# Patient Record
Sex: MALE | Race: BLACK OR AFRICAN AMERICAN | NOT HISPANIC OR LATINO | Employment: FULL TIME | URBAN - METROPOLITAN AREA
[De-identification: names, ages, dates, MRNs, and addresses within clinical notes are randomized per-mention and may not be internally consistent; named-entity substitution may affect disease eponyms.]

---

## 2017-05-30 DIAGNOSIS — M47.817 LUMBOSACRAL SPONDYLOSIS: Primary | ICD-10-CM

## 2017-06-02 ENCOUNTER — CLINICAL SUPPORT (OUTPATIENT)
Dept: REHABILITATION | Facility: HOSPITAL | Age: 37
End: 2017-06-02
Attending: NEUROLOGICAL SURGERY
Payer: OTHER MISCELLANEOUS

## 2017-06-02 DIAGNOSIS — M54.50 ACUTE MIDLINE LOW BACK PAIN WITHOUT SCIATICA: Primary | ICD-10-CM

## 2017-06-02 DIAGNOSIS — M47.817 SPONDYLOSIS OF LUMBOSACRAL REGION WITHOUT MYELOPATHY OR RADICULOPATHY: ICD-10-CM

## 2017-06-02 PROCEDURE — 97161 PT EVAL LOW COMPLEX 20 MIN: CPT | Performed by: PHYSICAL MEDICINE & REHABILITATION

## 2017-06-02 PROCEDURE — 97110 THERAPEUTIC EXERCISES: CPT | Performed by: PHYSICAL MEDICINE & REHABILITATION

## 2017-06-02 NOTE — PLAN OF CARE
Create Note                              My Note   8:15 AM             Edit                                       Name:Shane Harry    Physician:Michele Ace Jr.,*    Date of eval:6/2/2017    Orders:  Physical Therapy evaluate and treat    Clinic: [id number]    Diagnosis:      1.     Acute midline low back pain without sciatica              2.     Spondylosis of lumbosacral region without myelopathy or radiculopathy                        Precautions: none    Evaluation date: 6/2/2017    Visit # authorized: 1/12    Authorization period: 9/12/17    Plan of care expiration: 6/30/17         Start time: 8:15 AM    Stop Time: 9:00 AM           Timed                   Procedure      Min         Units        TE x 1      15     1                                                                          Untimed                   Procedure      Min      Units        IE     30     1                                                           Subjective            Chief complaint: Patient  has had low back pain since August, 2013 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 At time of injury went to PCP, and was given medication and had MRI.   [facility] and had [facility] a couple of months ago---doesn't have results with him.  has been to physical therapy with no relief of pain.  has had several JODI to lumbar spine with last one about a year ago.     Onset of pain : 2013     Mechanism of onset :  Merchandise falling on him at work         Aggravating factors: sitting > 10 mins, standing > 15 mins, walking > 2 blocks, lifting > 20#    Easing factors: Norco,Flexoril,   heat    Sleep is disturbed. Sleeping position: sides    Loss of Bowel/Bladder Function: (-)    PLOF  Includes:Independent with ADL's prior to 2013    Prior Physical Therapy: for low back about 2 years ago    Current functional limitations: sitting,  "walking, standing, lifting,          Home/Living Environment: Lives alone with no steps in home         Pertinent PMH/Comorbidities:  None         Patients structured exercise routine: None    Exercise routine prior to onset: None         Occupation: Pt no longer employed.              MRI: none on file             Xray: None on file         Pain level with 0 being the lowest and 10 being the highest presently: 7/10    Pain level with 0 being the lowest and 10 being the highest at worst: 10/10    Pain level with 0 being the lowest and 10 being the highest at best: 5/10          Patient Goals: "i want to feel better"           Objective            Postural examination in standing and sitting:    - (+)   forward head    - (+) forward shoulders    - (-) hip high    -(-) shoulder high    - (+)decreased lumbar lordosis    - (-)Thoracic kyphosis              Functional assessment:     - walking/gait:Ambulates independent of AD with normal gait pattern    - sit to stand: Independent    - sit to supine: Independent         - supine to sit: independent    - supine to prone: independent          Flexibility testing:    - hamstrings:     90/90 test R -45 L -35             - gastrocnemius:   DF ankle R 10 degrees L 10 degrees    - piriformis:   (+) (B)  tightness               - quadriceps:  (+) (B)   tightness              - hip flexors:(+) (b) tightness    - hip adductors: (-)    - IT Bands: (-)         Lumbar ROM: (measured in degrees)               Degrees     Quality       Flexion     50       With c/o pain in low back thruROM       Extension     10       With c/o pain in low back thru ROM       Left Side Bending     30     With no c/o pain       Right Side Bending     30     With c/o pain in low back thru ROM                                                    AROM bilateral hips, knees and ankles WNL         Dermatomes: (impaired/normal)              RLE     LLE       L2     Intact     Intact       L3     Intact " "    Intact       L4     Intact     Intact       L5     Intact     Intact       S1     Intact     Intact            Muscle Strength      MMT     R     L       Hip flexion     5/5     5/5       Hip abduction     5/5     5/5       Hip extension     5/5     5/5       Hip ER     5/5     5/5       Hip IR     5/5     5/5       Knee extension     5/5     5/5       Knee flexion     5/5     5/5       Ankle dorsiflexion     5/5     5/5       Ankle plantar flexion     5/5     5/5       Ankle inversion     5/5     5/5       Ankle eversion     5/5     5/5            Reflexes: 2+          Special Tests: ((+): pos.; (-): neg.)     SLR Test: (-) (B)            SKC Test: (-) (B)    Hip Screen:(-) DAJA for low back pain      Repetitive Motion: (-) for pain in low back with flexion       Prone Instability: NT      Saddle Sensation: (-)           Palpation for condition: No c/o pain on palpation of lumbar spine and paraspinal muscles               Joint Mobility:  Hypomobilit lumbar vertebra          Endurance is Good         PT Evaluation Completed? Yes    Discussed Plan of Care with patient: Yes         Outcome measures:      Impairment function:  Mobility    FOTO score: 36/100              TREATMENT:    Therapeutic exercise: Shane received therapeutic exercises to develop strength, stabilization and endurance; flexibility and range of motion for 15 minutes including:see HEP sheet.            Date      6/2/17       Visit       1/12       POC Exp Date      6/30/17       Face to Face              LTR     1 x 10       Supine HSS (B)      5 x 10"       Supine Piriformis (S) (B)      5 x 10"       Supine Hip flexor (S) (B)      5 x 10"       Prone Press Ups      1 x 10       Standing Back Extension      1 x 10                                                               Initials          VK                      Pt. Education: Instructed pt. regarding:proper technique with all exercises and use of lumbar roll with sitting.  Pt. to " demonstrate good understanding of the education provided. Shane demonstrated good return demonstration of activities. No cultural, environmental, or spiritual barriers identified to treatment or learning.           Assessment       This is a 37 y.o. male referred to outpatient physical therapy and presents with a medical diagnosis of low back pain and spondylosis lumbar spine and PT diagnosis/findings of low back pain, decreased AROM lumbar spine, poor posture, decreased strength core and back extensor muscles, demonstrating limitations as described in the problem list. Patient was in agreement with set goals and plan of care. Pt was given a written HEP along with posture education, instruction on body mechanics, activity modification/avoidance, and low back/LE stretching regimen. Pt. verbally understood instructions and demonstrated proper form/technique. Pt was advised to perform these exercises free of pain, and discontinue use if symptoms persist/worsen. Pt will benefit from physical therapy services in order to maximize pain free functional independence. Rehab potential is good.           History    Co-morbidities and personal factors that may impact the plan of care     Examination    Body Structures and Functions, activity limitations and participation restrictions that may impact the plan of care     Clinical Presentation       Decision Making/ Complexity Score       Co-morbidities:     None              Personal Factors:     None     Body Regions: Lumbar spine         Body Systems: Musculoskeletal: decreased AROM lumbar spine, poor posture, decreased strength core and back extensor muscles                        Activity limitations/Participation Restrictions: Mobility, General tasks and demands                                Stable and uncomplicated               Low              FOTO Score: 36/100                      Medical necessity is demonstrated by the following IMPAIRMENTS/PROBLEM  LIST:    Decreased range of motion lumbar spine    Decreased strength core nad back extensor muscles    Increased pain with walking    Poor Posture    Decreased Flexibility     Increased pain with prolonged standing    Increased pain prolonged sitting    Disturbed sleep    ADL and household activities lead to increased pain and are limited    Functional impairment rating of: 60%         GOALS:     Short Term Goals:  2 weeks    Increase range of motion 25%    Increase strength 1/2 muscle grade    Improve flexibility of low back and hips    Patient will demonstrate good posture and body mechanics with ADL's    Be able to perform HEP with minimal cueing required    Improve functional impairment of mobility to 40%         Long Term Goals: 4 weeks    Increase range of motion to 75% to 100% full     Improve muscle strength with MMT to 4+/5 to 5/5 core and back extensor muscles    Restore ability to ambulate with normal pain free gait    Walking for ADL and exercise will be restored without increased pain    Restore ability to stand for ADL without increased pain    Restore ability to sit  without increased pain    Restore normal sleep habits without disturbances due to pain    Restore ability to perform ADL's and household activities independently and without increased pain    Improve functional impairment of mobility to 20% or less           Plan            Pt will be treated by physical therapy 3 times a week for 4 weeks to include: Therapeutic exercises to increase ROM, strength and stabilization; joint and soft tissue mobilization with manual therapy techniques to decrease muscle tightness, pain and improve joint mobility; neuromuscular re-education to improve balance, coordination, kinesthetic sense and proprioception, therapeutic activities to improve coordination, strength and function, therapeutic taping to decrease pain, provide support and improve function of the LE(s); modalities such as moist heat, ice,  ultrasound and electrical stimulation to increase circulation, decrease pain and inflammation; dry needling with manual therapy techniques to decrease pain, inflammation and swelling, increase circulation and promote healing process will be considered and utilized as needed;  aquatic physical therapy will be utilized as needed.  Pt may be seen by PTA to carry out plan of care as part of the Rehab team.         I certify the need for these services furnished under this plan of treatment and while under my care.         ____________________________________ Physician/Referring Practitioner                                                   Date of Signature                        Inna العراقي PT

## 2017-06-02 NOTE — PROGRESS NOTES
Name:Shane Harry  Physician:Michele Ace Jr.,*  Date of eval:6/2/2017  Orders:  Physical Therapy evaluate and treat  Clinic: [id number]  Diagnosis:  1. Acute midline low back pain without sciatica     2. Spondylosis of lumbosacral region without myelopathy or radiculopathy         Precautions: none  Evaluation date: 6/2/2017  Visit # authorized: 1/12  Authorization period: 9/12/17  Plan of care expiration: 6/30/17    Start time: 8:15 AM  Stop Time: 9:00 AM    Timed     Procedure  Min     Units    TE x 1  15 1                    Untimed     Procedure  Min  Units    IE 30 1                 Subjective     Chief complaint: Patient  has had low back pain since August, 2013 due to merchandise at [employer] where he was working fell onto him. At time of injury went to PCP, and was given medication and had MRI.   [facility] and had MRI a couple of months ago---doesn't have results with him.  has been to physical therapy with no relief of pain.  has had several JODI to lumbar spine with last one about a year ago.   Onset of pain : 2013   Mechanism of onset :  Merchandise falling on him at work    Aggravating factors: sitting > 10 mins, standing > 15 mins, walking > 2 blocks, lifting > 20#  Easing factors: Norco,Flexoril,   heat  Sleep is disturbed. Sleeping position: sides  Loss of Bowel/Bladder Function: (-)  PLOF  Includes:Independent with ADL's prior to 2013  Prior Physical Therapy: for low back about 2 years ago  Current functional limitations: sitting, walking, standing, lifting,     Home/Living Environment: Lives alone with no steps in home    Pertinent PMH/Comorbidities:  None    Patients structured exercise routine: None  Exercise routine prior to onset: None    Occupation: Pt no longer employed.      MRI: none on file           Xray: None on file    Pain level with 0 being the lowest and 10 being the highest presently: 7/10  Pain level with 0 being the lowest  "and 10 being the highest at worst: 10/10  Pain level with 0 being the lowest and 10 being the highest at best: 5/10     Patient Goals: "i want to feel better"    Objective     Postural examination in standing and sitting:  - (+)   forward head  - (+) forward shoulders  - (-) hip high  -(-) shoulder high  - (+)decreased lumbar lordosis  - (-)Thoracic kyphosis        Functional assessment:   - walking/gait:Ambulates independent of AD with normal gait pattern  - sit to stand: Independent  - sit to supine: Independent       - supine to sit: independent  - supine to prone: independent     Flexibility testing:  - hamstrings:     90/90 test R -45 L -35           - gastrocnemius:   DF ankle R 10 degrees L 10 degrees  - piriformis:   (+) (B)  tightness             - quadriceps:  (+) (B)   tightness            - hip flexors:(+) (b) tightness  - hip adductors: (-)  - IT Bands: (-)    Lumbar ROM: (measured in degrees)    Degrees Quality   Flexion 50   With c/o pain in low back thruROM   Extension 10   With c/o pain in low back thru ROM   Left Side Bending 30 With no c/o pain   Right Side Bending 30 With c/o pain in low back thru ROM               AROM bilateral hips, knees and ankles WNL    Dermatomes: (impaired/normal)     RLE LLE   L2 Intact Intact   L3 Intact Intact   L4 Intact Intact   L5 Intact Intact   S1 Intact Intact     Muscle Strength  MMT R L   Hip flexion 5/5 5/5   Hip abduction 5/5 5/5   Hip extension 5/5 5/5   Hip ER 5/5 5/5   Hip IR 5/5 5/5   Knee extension 5/5 5/5   Knee flexion 5/5 5/5   Ankle dorsiflexion 5/5 5/5   Ankle plantar flexion 5/5 5/5   Ankle inversion 5/5 5/5   Ankle eversion 5/5 5/5     Reflexes: 2+     Special Tests: ((+): pos.; (-): neg.)   · SLR Test: (-) (B)        SKC Test: (-) (B)  · Hip Screen:(-) DAJA for low back pain  · Repetitive Motion: (-) for pain in low back with flexion   · Prone Instability: NT  · Saddle Sensation: (-)    Palpation for condition: No c/o pain on palpation of lumbar " "spine and paraspinal muscles       Joint Mobility:  Hypomobilit lumbar vertebra     Endurance is Good    PT Evaluation Completed? Yes  Discussed Plan of Care with patient: Yes    Outcome measures:    Impairment function:  Mobility  FOTO score: 36/100      TREATMENT:  Therapeutic exercise: Shane received therapeutic exercises to develop strength, stabilization and endurance; flexibility and range of motion for 15 minutes including:see HEP sheet.     Date  6/2/17   Visit   1/12   POC Exp Date  6/30/17   Face to Face     LTR 1 x 10   Supine HSS (B)  5 x 10"   Supine Piriformis (S) (B)  5 x 10"   Supine Hip flexor (S) (B)  5 x 10"   Prone Press Ups  1 x 10   Standing Back Extension  1 x 10                   Initials      VK         Pt. Education: Instructed pt. regarding:proper technique with all exercises and use of lumbar roll with sitting.  Pt. to demonstrate good understanding of the education provided. Shane demonstrated good return demonstration of activities. No cultural, environmental, or spiritual barriers identified to treatment or learning.    Assessment   This is a 37 y.o. male referred to outpatient physical therapy and presents with a medical diagnosis of low back pain and spondylosis lumbar spine and PT diagnosis/findings of low back pain, decreased AROM lumbar spine, poor posture, decreased strength core and back extensor muscles, demonstrating limitations as described in the problem list. Patient was in agreement with set goals and plan of care. Pt was given a written HEP along with posture education, instruction on body mechanics, activity modification/avoidance, and low back/LE stretching regimen. Pt. verbally understood instructions and demonstrated proper form/technique. Pt was advised to perform these exercises free of pain, and discontinue use if symptoms persist/worsen. Pt will benefit from physical therapy services in order to maximize pain free functional independence. Rehab potential is " good.    History  Co-morbidities and personal factors that may impact the plan of care Examination  Body Structures and Functions, activity limitations and participation restrictions that may impact the plan of care Clinical Presentation   Decision Making/ Complexity Score   Co-morbidities:   None      Personal Factors:   None Body Regions: Lumbar spine    Body Systems: Musculoskeletal: decreased AROM lumbar spine, poor posture, decreased strength core and back extensor muscles          Activity limitations/Participation Restrictions: Mobility, General tasks and demands             Stable and uncomplicated     Low      FOTO Score: 36/100         Medical necessity is demonstrated by the following IMPAIRMENTS/PROBLEM LIST:  Decreased range of motion lumbar spine  Decreased strength core nad back extensor muscles  Increased pain with walking  Poor Posture  Decreased Flexibility   Increased pain with prolonged standing  Increased pain prolonged sitting  Disturbed sleep  ADL and household activities lead to increased pain and are limited  Functional impairment rating of: 60%    GOALS:   Short Term Goals:  2 weeks  Increase range of motion 25%  Increase strength 1/2 muscle grade  Improve flexibility of low back and hips  Patient will demonstrate good posture and body mechanics with ADL's  Be able to perform HEP with minimal cueing required  Improve functional impairment of mobility to 40%    Long Term Goals: 4 weeks  Increase range of motion to 75% to 100% full   Improve muscle strength with MMT to 4+/5 to 5/5 core and back extensor muscles  Restore ability to ambulate with normal pain free gait  Walking for ADL and exercise will be restored without increased pain  Restore ability to stand for ADL without increased pain  Restore ability to sit  without increased pain  Restore normal sleep habits without disturbances due to pain  Restore ability to perform ADL's and household activities independently and without increased  pain  Improve functional impairment of mobility to 20% or less    Plan     Pt will be treated by physical therapy 3 times a week for 4 weeks to include: Therapeutic exercises to increase ROM, strength and stabilization; joint and soft tissue mobilization with manual therapy techniques to decrease muscle tightness, pain and improve joint mobility; neuromuscular re-education to improve balance, coordination, kinesthetic sense and proprioception, therapeutic activities to improve coordination, strength and function, therapeutic taping to decrease pain, provide support and improve function of the LE(s); modalities such as moist heat, ice, ultrasound and electrical stimulation to increase circulation, decrease pain and inflammation; dry needling with manual therapy techniques to decrease pain, inflammation and swelling, increase circulation and promote healing process will be considered and utilized as needed;  aquatic physical therapy will be utilized as needed.  Pt may be seen by PTA to carry out plan of care as part of the Rehab team.    I certify the need for these services furnished under this plan of treatment and while under my care.    ____________________________________ Physician/Referring Practitioner                                  Date of Signature          Inna العراقي PT

## 2017-06-12 ENCOUNTER — DOCUMENTATION ONLY (OUTPATIENT)
Dept: REHABILITATION | Facility: HOSPITAL | Age: 37
End: 2017-06-12

## 2017-06-12 DIAGNOSIS — M47.817 SPONDYLOSIS OF LUMBOSACRAL REGION WITHOUT MYELOPATHY OR RADICULOPATHY: ICD-10-CM

## 2017-06-12 DIAGNOSIS — M54.50 ACUTE MIDLINE LOW BACK PAIN WITHOUT SCIATICA: ICD-10-CM

## 2017-06-12 NOTE — PROGRESS NOTES
I called patient today as he has No showed for his last 4 scheduled physical therapy visits. I left Mr. Harry a message on his voice mail and asked him to please call if he would like to schedule an appointment for physical therapy. Patient has been removed from the schedule at this time due to his non-compliance with attending physical therapy.      Inna العراقي, PT

## 2017-06-30 ENCOUNTER — DOCUMENTATION ONLY (OUTPATIENT)
Dept: REHABILITATION | Facility: HOSPITAL | Age: 37
End: 2017-06-30

## 2017-06-30 DIAGNOSIS — M54.50 ACUTE MIDLINE LOW BACK PAIN WITHOUT SCIATICA: ICD-10-CM

## 2017-06-30 DIAGNOSIS — M47.817 SPONDYLOSIS OF LUMBOSACRAL REGION WITHOUT MYELOPATHY OR RADICULOPATHY: ICD-10-CM

## 2017-06-30 NOTE — PROGRESS NOTES
Mr. Harry was seen in outpatient physical therapy for an initial evaluation on 6/2/17 for low back pain. Mr. Harry has self discharged as he has No Showed for all follow up physical therapy sessions and has not returned to physical therapy since his initial evaluation. POC has ended and patient is discharged from physical therapy.      Inna العراقي, PT

## 2017-11-14 ENCOUNTER — HOSPITAL ENCOUNTER (EMERGENCY)
Facility: OTHER | Age: 37
Discharge: HOME | End: 2017-11-14
Attending: EMERGENCY MEDICINE
Payer: MEDICARE

## 2017-11-14 VITALS
TEMPERATURE: 99 F | HEART RATE: 67 BPM | SYSTOLIC BLOOD PRESSURE: 120 MMHG | HEIGHT: 75 IN | OXYGEN SATURATION: 98 % | WEIGHT: 240 LBS | BODY MASS INDEX: 29.84 KG/M2 | RESPIRATION RATE: 18 BRPM | DIASTOLIC BLOOD PRESSURE: 69 MMHG

## 2017-11-14 DIAGNOSIS — J06.9 UPPER RESPIRATORY TRACT INFECTION, UNSPECIFIED TYPE: Primary | ICD-10-CM

## 2017-11-14 DIAGNOSIS — R07.9 CHEST PAIN: ICD-10-CM

## 2017-11-14 DIAGNOSIS — R09.1 PLEURISY: ICD-10-CM

## 2017-11-14 DIAGNOSIS — R05.9 COUGH: ICD-10-CM

## 2017-11-14 PROCEDURE — 93010 ELECTROCARDIOGRAM REPORT: CPT | Mod: ,,, | Performed by: INTERNAL MEDICINE

## 2017-11-14 PROCEDURE — 99284 EMERGENCY DEPT VISIT MOD MDM: CPT | Mod: 25

## 2017-11-14 PROCEDURE — 93005 ELECTROCARDIOGRAM TRACING: CPT

## 2017-11-14 RX ORDER — IBUPROFEN 600 MG/1
600 TABLET ORAL EVERY 6 HOURS PRN
Qty: 20 TABLET | Refills: 0 | Status: SHIPPED | OUTPATIENT
Start: 2017-11-14

## 2017-11-14 RX ORDER — CETIRIZINE HYDROCHLORIDE 10 MG/1
10 TABLET ORAL DAILY
Qty: 30 TABLET | Refills: 0 | Status: SHIPPED | OUTPATIENT
Start: 2017-11-14 | End: 2018-11-14

## 2017-11-14 RX ORDER — FLUTICASONE PROPIONATE 50 MCG
1 SPRAY, SUSPENSION (ML) NASAL 2 TIMES DAILY PRN
Qty: 15 G | Refills: 0 | Status: SHIPPED | OUTPATIENT
Start: 2017-11-14

## 2017-11-14 RX ORDER — BENZONATATE 200 MG/1
200 CAPSULE ORAL 3 TIMES DAILY PRN
Qty: 20 CAPSULE | Refills: 0 | Status: SHIPPED | OUTPATIENT
Start: 2017-11-14 | End: 2017-11-24

## 2017-11-14 NOTE — ED TRIAGE NOTES
Pt present with cough, onset 3 days ago. Productive cough, pt reports green mucous. Pt reports mid sternal chest pain with coughing. Pt denies dizziness, lightheadedness, denies NVD.  Pt denies any fevers. Pt reports coughing is waking him up at night. Pt has been taking mucinex without relief. Pt in NAD.

## 2017-11-14 NOTE — ED NOTES
NEURO: Pt AAO x 4. Behavior and speech appropriate to situation.   CARDIAC: Regular rhythm auscultated  RESPIRATORY: Respirations even and unlabored. Breath sounds clear to all lung fields. diminished breath sounds to posterior lung field.   MUSCULOSKELETAL: Active ROM noted to extremities

## 2017-11-14 NOTE — ED PROVIDER NOTES
Encounter Date: 11/14/2017       History     Chief Complaint   Patient presents with    Chest Pain     non radiating chest pain x 3 days with productive cough, green phlegm and SOB     37-year-old male with no significant past medical history is not a current smoker presents to the emergency department with complaints of chest pain with cough.  He states his symptoms and present for the last 3 days.  He states his cough is productive of green sputum.  He denies any fever at home.  He states that he's been treating with Mucinex at home.  He denies any shortness of breath to me.  He states the pain is located to the middle of his chest.  He states the pain is currently a 9 out of 10.      The history is provided by the patient and the spouse.     Review of patient's allergies indicates:  Allergies not on file  History reviewed. No pertinent past medical history.  Past Surgical History:   Procedure Laterality Date    NO PAST SURGERIES       History reviewed. No pertinent family history.  Social History   Substance Use Topics    Smoking status: Never Smoker    Smokeless tobacco: Never Used    Alcohol use No     Review of Systems   Constitutional: Negative for chills and fever.   HENT: Negative for sore throat.    Respiratory: Positive for cough. Negative for shortness of breath.    Cardiovascular: Positive for chest pain.   Gastrointestinal: Negative for nausea and vomiting.   Genitourinary: Negative for dysuria.   Musculoskeletal: Negative for back pain.   Skin: Negative for rash.   Neurological: Negative for weakness.   Hematological: Does not bruise/bleed easily.       Physical Exam     Initial Vitals [11/14/17 1456]   BP Pulse Resp Temp SpO2   135/65 77 16 98.6 °F (37 °C) 98 %      MAP       88.33         Physical Exam    Nursing note and vitals reviewed.  Constitutional: Vital signs are normal. He appears well-developed and well-nourished. He is not diaphoretic.  Non-toxic appearance. No distress.   HENT:    Head: Normocephalic and atraumatic.   Right Ear: External ear normal.   Left Ear: External ear normal.   Nose: Nose normal.   Mouth/Throat: Oropharynx is clear and moist.   Eyes: Conjunctivae, EOM and lids are normal. Pupils are equal, round, and reactive to light. No scleral icterus.   Neck: Normal range of motion and phonation normal. Neck supple.   Cardiovascular: Normal rate, regular rhythm and normal heart sounds. Exam reveals no gallop and no friction rub.    No murmur heard.  Pulmonary/Chest: Effort normal and breath sounds normal. No respiratory distress. He has no decreased breath sounds. He has no wheezes. He has no rhonchi. He has no rales. He exhibits no tenderness and no bony tenderness.   Abdominal: Normal appearance.   Musculoskeletal: Normal range of motion.   No obvious deformities, moving all extremities, normal gait   Neurological: He is alert and oriented to person, place, and time. He has normal strength and normal reflexes. No sensory deficit.   Skin: Skin is warm, dry and intact. No lesion and no rash noted. No erythema.   Psychiatric: He has a normal mood and affect. His speech is normal and behavior is normal. Judgment normal. Cognition and memory are normal.         ED Course   Procedures  Labs Reviewed - No data to display  EKG Readings: (Independently Interpreted)   Initial Reading: No STEMI. Rhythm: Normal Sinus Rhythm. Heart Rate: 78. Ectopy: No Ectopy. Conduction: Normal. ST Segments: Normal ST Segments. T Waves: Normal. Clinical Impression: Normal Sinus Rhythm       X-Rays:   Independently Interpreted Readings:   Chest X-Ray: Normal heart size.  No infiltrates.  No acute abnormalities.      Imaging Results          X-Ray Chest PA And Lateral (Final result)  Result time 11/14/17 17:05:19    Final result by Urban Hays MD (11/14/17 17:05:19)                 Impression:     No acute cardiopulmonary process.  Hypoventilatory exam.        Electronically signed by: URBAN HAYS  MD  Date:     11/14/17  Time:    17:05              Narrative:    Chest PA and lateral    Indication:Cough    Comparison:None    Findings:  The cardiomediastinal silhouette is not enlarged.  There is no pleural effusion.  The trachea is midline.  The lungs are symmetrically expanded bilaterally with mild accentuation of the interstitial attenuation, likely related to shallow inspiratory effort. No large focal consolidation seen.  There is no pneumothorax.  The osseous structures are unremarkable.                              Medical Decision Making:   History:   I obtained history from: someone other than patient.       <> Summary of History: spouse  Old Medical Records: I decided to obtain old medical records.  Initial Assessment:   37-year-old male with complaints consistent with pleuritic chest pain and cough and URI.  Vital signs stable, afebrile, neurovascularly intact.  He is alert, healthy and nontoxic appearing.  He is in no apparent distress.  No focal neurological deficits.  Pain is not reproducible.  Lungs are clear to auscultation.  Exam is otherwise benign  Independently Interpreted Test(s):   I have ordered and independently interpreted X-rays - see prior notes.  I have ordered and independently interpreted EKG Reading(s) - see prior notes  Clinical Tests:   Radiological Study: Ordered and Reviewed  Medical Tests: Ordered and Reviewed  ED Management:  Chest x-ray and EKG were obtained.  Both independently interpreted by myself.  EKG consistent with normal sinus rhythm with no evidence of acute STEMI or ischemia.  Chest x-ray shows no evidence of infiltrate, pleural effusion, mass, consolidation, lesion or pneumothorax.  I do not suspect PE as he is PERC negative.  He is stable will be discharged home with prescriptions for symptomatic treatment and care instructions.  He states understanding and agrees with this plan.  His follow-up with his doctor in the next 48 hours or return for any worsening signs  or symptoms.  This patient was discussed with the attending physician who agrees with treatment plan.  Other:   I have discussed this case with another health care provider.       <> Summary of the Discussion: Yanni  This note was created using Dragon Medical dictation.  There may be typographical errors secondary to dictation.                     ED Course      Clinical Impression:     1. Upper respiratory tract infection, unspecified type    2. Chest pain    3. Cough    4. Pleurisy          Disposition:   Disposition: Discharged  Condition: Stable                        Marisa Ojeda PA-C  11/14/17 9418

## 2021-02-03 ENCOUNTER — OCCUPATIONAL HEALTH (OUTPATIENT)
Dept: URGENT CARE | Facility: CLINIC | Age: 41
End: 2021-02-03

## 2021-02-03 DIAGNOSIS — Z02.1 PRE-EMPLOYMENT EXAMINATION: Primary | ICD-10-CM

## 2021-02-03 PROCEDURE — 99499 DOT PHYSICAL: ICD-10-PCS | Mod: S$GLB,,, | Performed by: NURSE PRACTITIONER

## 2021-02-03 PROCEDURE — 99499 UNLISTED E&M SERVICE: CPT | Mod: S$GLB,,, | Performed by: NURSE PRACTITIONER

## 2021-07-01 ENCOUNTER — PATIENT MESSAGE (OUTPATIENT)
Dept: ADMINISTRATIVE | Facility: OTHER | Age: 41
End: 2021-07-01

## 2022-06-06 ENCOUNTER — TELEPHONE (OUTPATIENT)
Dept: PODIATRY | Facility: CLINIC | Age: 42
End: 2022-06-06
Payer: MEDICARE

## 2022-06-06 NOTE — TELEPHONE ENCOUNTER
----- Message from Sin Reyes sent at 6/6/2022 12:04 PM CDT -----  Contact: Patient wife  Patient wife requesting call back in regards to scheduling appointment for patient due to having fungus between toes on left foot.      Patient @[phone] (home)

## 2022-06-06 NOTE — TELEPHONE ENCOUNTER
Spoke to pt and scheduled him for the next available appointment in Podiatry at [facility].

## 2022-06-09 DIAGNOSIS — M25.532 LEFT WRIST PAIN: Primary | ICD-10-CM

## 2022-06-17 ENCOUNTER — TELEPHONE (OUTPATIENT)
Dept: ORTHOPEDICS | Facility: CLINIC | Age: 42
End: 2022-06-17
Payer: MEDICARE

## 2022-06-23 ENCOUNTER — OFFICE VISIT (OUTPATIENT)
Dept: PODIATRY | Facility: CLINIC | Age: 42
End: 2022-06-23
Payer: MEDICARE

## 2022-06-23 VITALS
WEIGHT: 291 LBS | SYSTOLIC BLOOD PRESSURE: 144 MMHG | HEIGHT: 75 IN | DIASTOLIC BLOOD PRESSURE: 75 MMHG | HEART RATE: 81 BPM | BODY MASS INDEX: 36.18 KG/M2

## 2022-06-23 DIAGNOSIS — B35.3 TINEA PEDIS OF BOTH FEET: Primary | ICD-10-CM

## 2022-06-23 DIAGNOSIS — B35.3 TINEA PEDIS, UNSPECIFIED LATERALITY: ICD-10-CM

## 2022-06-23 PROCEDURE — 3078F DIAST BP <80 MM HG: CPT | Mod: CPTII,S$GLB,, | Performed by: PODIATRIST

## 2022-06-23 PROCEDURE — 3077F PR MOST RECENT SYSTOLIC BLOOD PRESSURE >= 140 MM HG: ICD-10-PCS | Mod: CPTII,S$GLB,, | Performed by: PODIATRIST

## 2022-06-23 PROCEDURE — 3008F PR BODY MASS INDEX (BMI) DOCUMENTED: ICD-10-PCS | Mod: CPTII,S$GLB,, | Performed by: PODIATRIST

## 2022-06-23 PROCEDURE — 99203 OFFICE O/P NEW LOW 30 MIN: CPT | Mod: S$GLB,,, | Performed by: PODIATRIST

## 2022-06-23 PROCEDURE — 3077F SYST BP >= 140 MM HG: CPT | Mod: CPTII,S$GLB,, | Performed by: PODIATRIST

## 2022-06-23 PROCEDURE — 1159F PR MEDICATION LIST DOCUMENTED IN MEDICAL RECORD: ICD-10-PCS | Mod: CPTII,S$GLB,, | Performed by: PODIATRIST

## 2022-06-23 PROCEDURE — 3008F BODY MASS INDEX DOCD: CPT | Mod: CPTII,S$GLB,, | Performed by: PODIATRIST

## 2022-06-23 PROCEDURE — 99999 PR PBB SHADOW E&M-EST. PATIENT-LVL III: CPT | Mod: PBBFAC,,, | Performed by: PODIATRIST

## 2022-06-23 PROCEDURE — 99213 OFFICE O/P EST LOW 20 MIN: CPT | Mod: PBBFAC | Performed by: PODIATRIST

## 2022-06-23 PROCEDURE — 1159F MED LIST DOCD IN RCRD: CPT | Mod: CPTII,S$GLB,, | Performed by: PODIATRIST

## 2022-06-23 PROCEDURE — 99203 PR OFFICE/OUTPT VISIT, NEW, LEVL III, 30-44 MIN: ICD-10-PCS | Mod: S$GLB,,, | Performed by: PODIATRIST

## 2022-06-23 PROCEDURE — 1160F PR REVIEW ALL MEDS BY PRESCRIBER/CLIN PHARMACIST DOCUMENTED: ICD-10-PCS | Mod: CPTII,S$GLB,, | Performed by: PODIATRIST

## 2022-06-23 PROCEDURE — 1160F RVW MEDS BY RX/DR IN RCRD: CPT | Mod: CPTII,S$GLB,, | Performed by: PODIATRIST

## 2022-06-23 PROCEDURE — 3078F PR MOST RECENT DIASTOLIC BLOOD PRESSURE < 80 MM HG: ICD-10-PCS | Mod: CPTII,S$GLB,, | Performed by: PODIATRIST

## 2022-06-23 PROCEDURE — 99999 PR PBB SHADOW E&M-EST. PATIENT-LVL III: ICD-10-PCS | Mod: PBBFAC,,, | Performed by: PODIATRIST

## 2022-06-23 RX ORDER — CLOTRIMAZOLE AND BETAMETHASONE DIPROPIONATE 10; .5 MG/ML; MG/ML
LOTION TOPICAL 2 TIMES DAILY
Qty: 30 ML | Refills: 6 | Status: SHIPPED | OUTPATIENT
Start: 2022-06-23 | End: 2022-06-28 | Stop reason: SDUPTHER

## 2022-06-23 RX ORDER — BICTEGRAVIR SODIUM, EMTRICITABINE, AND TENOFOVIR ALAFENAMIDE FUMARATE 50; 200; 25 MG/1; MG/1; MG/1
1 TABLET ORAL DAILY
COMMUNITY
Start: 2022-06-06

## 2022-06-23 RX ORDER — METFORMIN HYDROCHLORIDE 500 MG/1
500 TABLET ORAL DAILY
COMMUNITY
Start: 2022-06-06

## 2022-06-23 RX ORDER — ATORVASTATIN CALCIUM 40 MG/1
40 TABLET, FILM COATED ORAL DAILY
COMMUNITY
Start: 2022-06-06

## 2022-06-23 NOTE — PROGRESS NOTES
"Subjective:      Patient ID: Shane Harry is a 42 y.o. male.    Chief Complaint: Foot Problem (Foot fungus)    Shane is a 42 y.o. male who presents to the clinic complaining of itchy, dry flaky skin between toes and bottoms of both feet. Shane is inquiring about treatment options. Has been present for 1-2 years. Progressively getting worse. No other pedal concerns at this time.     Vitals:    06/23/22 0847   BP: (!) 144/75   Pulse: 81   Weight: 132 kg (291 lb 0.1 oz)   Height: 6' 3" (1.905 m)   PainSc: 0-No pain       Review of Systems   Constitutional: Negative for chills and fever.   Cardiovascular: Negative for chest pain, claudication and leg swelling.   Respiratory: Negative for cough and shortness of breath.    Skin: Positive for dry skin, itching and rash. Negative for nail changes.   Musculoskeletal: Negative.    Gastrointestinal: Negative for nausea and vomiting.   Neurological: Negative for numbness and paresthesias.   Psychiatric/Behavioral: Negative for altered mental status.           Objective:      Physical Exam  Vitals reviewed.   Constitutional:       Appearance: He is well-developed.   HENT:      Head: Normocephalic.   Cardiovascular:      Pulses:           Dorsalis pedis pulses are 2+ on the right side and 2+ on the left side.        Posterior tibial pulses are 2+ on the right side and 2+ on the left side.      Comments: CRT < 3 sec to tips of toes. No edema noted to b/l LE. No vericosities noted to b/l LEs.     Pulmonary:      Effort: No respiratory distress.   Musculoskeletal:      Comments: Hammertoe contractures noted to toes 2-4 L foot with 2nd toe overlapping hallux and 3rd toe. All reducible and asymptomatic. Otherwise rectus foot position bilateral with no major deformities noted. Mild equinus noted b/l ankles with < 10 deg DF noted. MMT 5/5 in DF/PF/Inv/Ev resistance with no reproduction of pain in any direction. Passive range of motion of ankle and pedal joints is painless " b/l.     Skin:     General: Skin is warm and dry.      Findings: No erythema.      Comments: Xerotic rash noted to b/l arches and interdigital spaces with associated maceration to 2nd/3rd interdigital spaces b/l. There is associated itching present to forefoot and between toes b/l foot. No open lesions, lacerations or wounds noted. Nails are normotrophic to R 1-5 and L 1-5. Interdigital spaces clean, dry and intact b/l. No erythema noted to b/l foot. Skin texture dry, flaky. Pedal hair normal. Skin temperature normal b/l foot.      Neurological:      Mental Status: He is alert and oriented to person, place, and time.      Sensory: No sensory deficit.      Comments: Light touch, proprioception, and sharp/dull sensation are all intact bilaterally.       Psychiatric:         Behavior: Behavior normal.         Thought Content: Thought content normal.         Judgment: Judgment normal.               Assessment:       Encounter Diagnoses   Name Primary?    Tinea pedis of both feet Yes    Tinea pedis, unspecified laterality          Plan:       Shane was seen today for foot problem.    Diagnoses and all orders for this visit:    Tinea pedis of both feet    Tinea pedis, unspecified laterality    Other orders  -     clotrimazole-betamethasone (LOTRISONE) lotion; Apply topically 2 (two) times daily. Apply 2 times daily to affected area for 2-4 weeks.      I counseled the patient on his conditions, their implications and medical management.    Change socks regularly, multiple times daily if excessive sweating occurs.     Dry well between toes daily. Avoid excessive moisture between toes. Ok to use OTC foot powder to help wick away moisture.     Gentian violet applied to all interdigital spaces today.     Rx Lotrisone to be applied to affected spaces twice daily for minimum of 2 weeks. Add additional week if not resolved.    RTC 4 weeks for reassessment, sooner PRN. Consider oral medication if no improvement.

## 2022-06-24 ENCOUNTER — OFFICE VISIT (OUTPATIENT)
Dept: ORTHOPEDICS | Facility: CLINIC | Age: 42
End: 2022-06-24
Payer: MEDICARE

## 2022-06-24 DIAGNOSIS — M65.4 DE QUERVAIN'S TENOSYNOVITIS, LEFT: Primary | ICD-10-CM

## 2022-06-24 PROCEDURE — 1159F MED LIST DOCD IN RCRD: CPT | Mod: CPTII,S$GLB,, | Performed by: PHYSICIAN ASSISTANT

## 2022-06-24 PROCEDURE — 99203 OFFICE O/P NEW LOW 30 MIN: CPT | Mod: 25,S$GLB,, | Performed by: PHYSICIAN ASSISTANT

## 2022-06-24 PROCEDURE — 99203 PR OFFICE/OUTPT VISIT, NEW, LEVL III, 30-44 MIN: ICD-10-PCS | Mod: 25,S$GLB,, | Performed by: PHYSICIAN ASSISTANT

## 2022-06-24 PROCEDURE — 20550 NJX 1 TENDON SHEATH/LIGAMENT: CPT | Mod: PBBFAC,PO,LT | Performed by: PHYSICIAN ASSISTANT

## 2022-06-24 PROCEDURE — 99213 OFFICE O/P EST LOW 20 MIN: CPT | Mod: PBBFAC,PO | Performed by: PHYSICIAN ASSISTANT

## 2022-06-24 PROCEDURE — 1159F PR MEDICATION LIST DOCUMENTED IN MEDICAL RECORD: ICD-10-PCS | Mod: CPTII,S$GLB,, | Performed by: PHYSICIAN ASSISTANT

## 2022-06-24 PROCEDURE — 20550 NJX 1 TENDON SHEATH/LIGAMENT: CPT | Mod: LT,S$GLB,, | Performed by: PHYSICIAN ASSISTANT

## 2022-06-24 PROCEDURE — 99999 PR PBB SHADOW E&M-EST. PATIENT-LVL III: ICD-10-PCS | Mod: PBBFAC,,, | Performed by: PHYSICIAN ASSISTANT

## 2022-06-24 PROCEDURE — 20550 TENDON SHEATH/LEFT DEQUERVAINS (1ST INJECTION): ICD-10-PCS | Mod: LT,S$GLB,, | Performed by: PHYSICIAN ASSISTANT

## 2022-06-24 PROCEDURE — 99999 PR PBB SHADOW E&M-EST. PATIENT-LVL III: CPT | Mod: PBBFAC,,, | Performed by: PHYSICIAN ASSISTANT

## 2022-06-24 RX ORDER — DEXAMETHASONE SODIUM PHOSPHATE 4 MG/ML
4 INJECTION, SOLUTION INTRA-ARTICULAR; INTRALESIONAL; INTRAMUSCULAR; INTRAVENOUS; SOFT TISSUE
Status: DISCONTINUED | OUTPATIENT
Start: 2022-06-24 | End: 2022-06-24 | Stop reason: HOSPADM

## 2022-06-24 RX ORDER — MELOXICAM 15 MG/1
15 TABLET ORAL DAILY
Qty: 30 TABLET | Refills: 0 | Status: SHIPPED | OUTPATIENT
Start: 2022-06-24 | End: 2022-08-24

## 2022-06-24 RX ADMIN — DEXAMETHASONE SODIUM PHOSPHATE 4 MG: 4 INJECTION INTRA-ARTICULAR; INTRALESIONAL; INTRAMUSCULAR; INTRAVENOUS; SOFT TISSUE at 08:06

## 2022-06-24 NOTE — PROGRESS NOTES
[facility]  History & Physical  Orthopedics    SUBJECTIVE:      Chief Complaint: Left wrist pain    Referring Provider: Valeriy King MD Dr. Dunbar is the supervising physician for this encounter/patient    History of Present Illness:  Patient is a 42 y.o. right hand dominant male who presents today with complaints of left wrist pain for about 3 weeks, no trauma/injury. He was seen in the ED for wrist pain and diagnosed with sprain, given wrist brace and Naproxen. He reports 8/10 pain, radial wrist, worse with thumb extension and gripping. No numbness/tingling.     Onset of symptoms/DOI was 3 weeks.    Symptoms are aggravated by activity and movement.    Symptoms are alleviated by rest.    Symptoms consist of pain.    The patient rates their pain as a 8/10.    Attempted treatment(s) and/or interventions include activity modifications, rest, anti-inflammatory medications and immobilization.     The patient denies any fevers, chills, N/V, D/C and presents for evaluation.       No past medical history on file.  Past Surgical History:   Procedure Laterality Date    NO PAST SURGERIES       Review of patient's allergies indicates:  No Known Allergies  Social History     Social History Narrative    Not on file     No family history on file.      Current Outpatient Medications:     atorvastatin (LIPITOR) 40 MG tablet, Take 40 mg by mouth once daily., Disp: , Rfl:     BIKTARVY -25 mg (25 kg or greater), Take 1 tablet by mouth once daily., Disp: , Rfl:     cetirizine (ZYRTEC) 10 MG tablet, Take 1 tablet (10 mg total) by mouth once daily., Disp: 30 tablet, Rfl: 0    clotrimazole-betamethasone (LOTRISONE) lotion, Apply topically 2 (two) times daily. Apply 2 times daily to affected area for 2-4 weeks. (Patient not taking: Reported on 6/24/2022), Disp: 30 mL, Rfl: 6    fluticasone (FLONASE) 50 mcg/actuation nasal spray, 1 spray by Each Nare route 2 (two) times daily as needed. (Patient not  taking: Reported on 6/24/2022), Disp: 15 g, Rfl: 0    ibuprofen (ADVIL,MOTRIN) 600 MG tablet, Take 1 tablet (600 mg total) by mouth every 6 (six) hours as needed for Pain. (Patient not taking: Reported on 6/24/2022), Disp: 20 tablet, Rfl: 0    meloxicam (MOBIC) 15 MG tablet, Take 1 tablet (15 mg total) by mouth once daily., Disp: 30 tablet, Rfl: 0    metFORMIN (GLUCOPHAGE) 500 MG tablet, Take 500 mg by mouth once daily., Disp: , Rfl:       Review of Systems:  Constitutional: no fever or chills  Eyes: no visual changes  ENT: no nasal congestion or sore throat  Respiratory: no cough or shortness of breath  Cardiovascular: no chest pain  Gastrointestinal: no nausea or vomiting, tolerating diet  Musculoskeletal: pain and soreness    OBJECTIVE:      Vital Signs (Most Recent):  There were no vitals filed for this visit.  There is no height or weight on file to calculate BMI.      Physical Exam:  Constitutional: The patient appears well-developed and well-nourished. No distress.   Skin: No lesions appreciated  Head: Normocephalic and atraumatic.   Nose: Nose normal.   Ears: No deformities seen  Eyes: Conjunctivae and EOM are normal.   Neck: No tracheal deviation present.   Cardiovascular: Normal rate and intact distal pulses.    Pulmonary/Chest: Effort normal. No respiratory distress.   Abdominal: There is no guarding.   Neurological: The patient is alert.   Psychiatric: The patient has a normal mood and affect.     Left Hand/Wrist Examination:    Observation/Inspection:  Swelling  none    Deformity  none  Discoloration  none     Scars   none    Atrophy  none    HAND/WRIST EXAMINATION:  Finkelstein's Test   POS  WHAT Test    POS  Snuff box tenderness   Neg  Hogan's Test    Neg  Hook of Hamate Tenderness  Neg  CMC grind    Neg  Circumduction test   Neg  Acutely TTP to the radial styloid    Neurovascular Exam:  Digits WWP, brisk CR < 3s throughout  NVI motor/LTS to M/R/U nerves, radial pulse 2+  Tinel's Test - Carpal  Tunnel  Neg  Tinel's Test - Cubital Tunnel  Neg  Phalen's Test    Neg  Median Nerve Compression Test Neg    ROM hand full, pain with thumb extension to the radial wrist    ROM wrist full, painless    ROM elbow full, painless    Abdomen not guarded  Respirations nonlabored  Perfusion intact    Diagnostic Results:     Imaging - I independently viewed the patient's imaging as well as the radiology report.  Xrays of the patient's left wrist  demonstrate no evidence of any acute fractures or dislocations or significant degenerative changes.    EMG - none    ASSESSMENT/PLAN:      42 y.o. yo male with Left wrist DeQuervains Tendonitis    Plan: The patient and I had a thorough discussion today.  We discussed the working diagnosis as well as several other potential alternative diagnoses.  Treatment options were discussed, both conservative and surgical.  Conservative treatment options would include things such as activity modifications, workplace modifications, a period of rest, oral vs topical OTC and prescription anti-inflammatory medications, occupational therapy, splinting/bracing, immobilization, corticosteroid injections, and others.  Surgical options were discussed as well.     At this time, the patient would like to proceed with a trial of DeQuervains CSI, performed today without complications. Thumb spica brace given today, Mobic 15mg ordered, discussed Voltaren gel massage and ice/heat. OT ordered. RTC on prn basis.    Should the patient's symptoms worsen, persist, or fail to improve they should return for reevaluation and I would be happy to see them back anytime.           Please do not hesitate to reach out to us via email, phone, or MyChart with any questions, concerns, or feedback.

## 2022-06-24 NOTE — PROCEDURES
Tendon Sheath/Left DeQuervains (1st injection)    Date/Time: 6/24/2022 8:00 AM  Performed by: Jamie L. Russo-Digeorge, PA-C  Authorized by: Jamie L. Russo-Digeorge, PA-C     Consent Done?:  Yes (Verbal)  Indications:  Pain  Site marked: the procedure site was marked    Timeout: prior to procedure the correct patient, procedure, and site was verified    Prep: patient was prepped and draped in usual sterile fashion      Local anesthesia used?: Yes    Anesthesia method: Topical cold spray used prior to the injection and 1cc 1% plain lidocaine contained in the injectate.  Local anesthetic:  Lidocaine 1% without epinephrine  Location:  Wrist  Site:  L first doral compartment  Ultrasonic guidance for needle placement?: No    Needle size:  25 G  Approach:  Radial  Medications:  4 mg dexamethasone 4 mg/mL  Patient tolerance:  Patient tolerated the procedure well with no immediate complications

## 2022-06-29 ENCOUNTER — PATIENT MESSAGE (OUTPATIENT)
Dept: PODIATRY | Facility: CLINIC | Age: 42
End: 2022-06-29
Payer: MEDICARE

## 2022-06-29 RX ORDER — CLOTRIMAZOLE AND BETAMETHASONE DIPROPIONATE 10; .64 MG/G; MG/G
CREAM TOPICAL 2 TIMES DAILY
Qty: 45 G | Refills: 5 | Status: SHIPPED | OUTPATIENT
Start: 2022-06-29 | End: 2022-07-13 | Stop reason: SDUPTHER

## 2022-06-30 ENCOUNTER — TELEPHONE (OUTPATIENT)
Dept: PODIATRY | Facility: CLINIC | Age: 42
End: 2022-06-30
Payer: MEDICARE

## 2022-06-30 NOTE — TELEPHONE ENCOUNTER
Spoke to pt and relayed the following per Dr. Salomon:    I ordered a lotion for him and it looks like the cream may be covered. I just put Rx in for cream. Let him know to see if its covered, if not I will see what else is available. Thanks.

## 2022-06-30 NOTE — TELEPHONE ENCOUNTER
----- Message from Palak Salomon DPM sent at 6/29/2022  4:01 PM CDT -----  Regarding: RE: New rx  I ordered a lotion for him and it looks like the cream may be covered. I just put Rx in for cream. Let him know to see if its covered, if not I will see what else is available. Thanks.   ----- Message -----  From: Liat Verduzco LPN  Sent: 6/29/2022   2:18 PM CDT  To: Palak Salomon DPM  Subject: New rx                                           Pt sent an email stating Lotrisone cream is not covered by his insurance. Pt would like a different Rx sent to his pharmacy.

## 2022-07-19 ENCOUNTER — DOCUMENTATION ONLY (OUTPATIENT)
Dept: REHABILITATION | Facility: HOSPITAL | Age: 42
End: 2022-07-19
Payer: MEDICARE

## 2022-08-05 ENCOUNTER — OFFICE VISIT (OUTPATIENT)
Dept: PODIATRY | Facility: CLINIC | Age: 42
End: 2022-08-05
Payer: MEDICARE

## 2022-08-05 VITALS
WEIGHT: 295.44 LBS | BODY MASS INDEX: 36.73 KG/M2 | HEART RATE: 77 BPM | HEIGHT: 75 IN | RESPIRATION RATE: 18 BRPM | DIASTOLIC BLOOD PRESSURE: 75 MMHG | SYSTOLIC BLOOD PRESSURE: 141 MMHG

## 2022-08-05 DIAGNOSIS — B35.3 TINEA PEDIS OF BOTH FEET: Primary | ICD-10-CM

## 2022-08-05 PROCEDURE — 1160F RVW MEDS BY RX/DR IN RCRD: CPT | Mod: CPTII,S$GLB,, | Performed by: PODIATRIST

## 2022-08-05 PROCEDURE — 3077F SYST BP >= 140 MM HG: CPT | Mod: CPTII,S$GLB,, | Performed by: PODIATRIST

## 2022-08-05 PROCEDURE — 3008F BODY MASS INDEX DOCD: CPT | Mod: CPTII,S$GLB,, | Performed by: PODIATRIST

## 2022-08-05 PROCEDURE — 99213 OFFICE O/P EST LOW 20 MIN: CPT | Mod: S$GLB,,, | Performed by: PODIATRIST

## 2022-08-05 PROCEDURE — 3008F PR BODY MASS INDEX (BMI) DOCUMENTED: ICD-10-PCS | Mod: CPTII,S$GLB,, | Performed by: PODIATRIST

## 2022-08-05 PROCEDURE — 99999 PR PBB SHADOW E&M-EST. PATIENT-LVL IV: ICD-10-PCS | Mod: PBBFAC,,, | Performed by: PODIATRIST

## 2022-08-05 PROCEDURE — 99999 PR PBB SHADOW E&M-EST. PATIENT-LVL IV: CPT | Mod: PBBFAC,,, | Performed by: PODIATRIST

## 2022-08-05 PROCEDURE — 3078F PR MOST RECENT DIASTOLIC BLOOD PRESSURE < 80 MM HG: ICD-10-PCS | Mod: CPTII,S$GLB,, | Performed by: PODIATRIST

## 2022-08-05 PROCEDURE — 99213 PR OFFICE/OUTPT VISIT, EST, LEVL III, 20-29 MIN: ICD-10-PCS | Mod: S$GLB,,, | Performed by: PODIATRIST

## 2022-08-05 PROCEDURE — 1159F PR MEDICATION LIST DOCUMENTED IN MEDICAL RECORD: ICD-10-PCS | Mod: CPTII,S$GLB,, | Performed by: PODIATRIST

## 2022-08-05 PROCEDURE — 1160F PR REVIEW ALL MEDS BY PRESCRIBER/CLIN PHARMACIST DOCUMENTED: ICD-10-PCS | Mod: CPTII,S$GLB,, | Performed by: PODIATRIST

## 2022-08-05 PROCEDURE — 3078F DIAST BP <80 MM HG: CPT | Mod: CPTII,S$GLB,, | Performed by: PODIATRIST

## 2022-08-05 PROCEDURE — 1159F MED LIST DOCD IN RCRD: CPT | Mod: CPTII,S$GLB,, | Performed by: PODIATRIST

## 2022-08-05 PROCEDURE — 3077F PR MOST RECENT SYSTOLIC BLOOD PRESSURE >= 140 MM HG: ICD-10-PCS | Mod: CPTII,S$GLB,, | Performed by: PODIATRIST

## 2022-08-05 PROCEDURE — 99214 OFFICE O/P EST MOD 30 MIN: CPT | Mod: PBBFAC | Performed by: PODIATRIST

## 2022-08-05 NOTE — PROGRESS NOTES
"Subjective:      Patient ID: Shane Harry is a 42 y.o. male.    Chief Complaint: Follow-up (Tinea pedis. Itching , doing much better  )    Shane is a 42 y.o. male who presents to the clinic complaining of itchy, dry flaky skin between toes and bottoms of both feet. Shane is inquiring about treatment options. Has been present for 1-2 years. Progressively getting worse. No other pedal concerns at this time.     08/05/2022: follow up for tinea pedis between multiple toes both feet. Previous gentian violet helped tremendously and also has been using topical Lotrisone twice a day as directed and states his feet feet feel much better. Minimal itching per patient. Here for follow up assessment.     Vitals:    08/05/22 0806   BP: (!) 141/75   Pulse: 77   Resp: 18   Weight: 134 kg (295 lb 6.7 oz)   Height: 6' 3" (1.905 m)   PainSc: 0-No pain       Review of Systems   Constitutional: Negative for chills and fever.   Cardiovascular: Negative for chest pain, claudication and leg swelling.   Respiratory: Negative for cough and shortness of breath.    Skin: Positive for dry skin, itching and rash. Negative for nail changes.   Musculoskeletal: Negative.    Gastrointestinal: Negative for nausea and vomiting.   Neurological: Negative for numbness and paresthesias.   Psychiatric/Behavioral: Negative for altered mental status.           Objective:      Physical Exam  Vitals reviewed.   Constitutional:       Appearance: He is well-developed.   HENT:      Head: Normocephalic.   Cardiovascular:      Pulses:           Dorsalis pedis pulses are 2+ on the right side and 2+ on the left side.        Posterior tibial pulses are 2+ on the right side and 2+ on the left side.      Comments: CRT < 3 sec to tips of toes. No edema noted to b/l LE. No vericosities noted to b/l LEs.     Pulmonary:      Effort: No respiratory distress.   Musculoskeletal:      Comments: Hammertoe contractures noted to toes 2-4 L foot with 2nd toe overlapping " hallux and 3rd toe. All reducible and asymptomatic. Otherwise rectus foot position bilateral with no major deformities noted. Mild equinus noted b/l ankles with < 10 deg DF noted. MMT 5/5 in DF/PF/Inv/Ev resistance with no reproduction of pain in any direction. Passive range of motion of ankle and pedal joints is painless b/l.     Skin:     General: Skin is warm and dry.      Findings: No erythema.      Comments: Xerosis noted to b/l arches, significantly improved. Minimal to no maceration to 2nd/3rd interdigital spaces b/l--95% improved and nearly resolved. There is no further associated itching present to forefoot and between toes b/l foot. No open lesions, lacerations or wounds noted. Nails are normotrophic to R 1-5 and L 1-5. Interdigital spaces clean, dry and intact b/l. No erythema noted to b/l foot. Skin texture dry, flaky. Pedal hair normal. Skin temperature normal b/l foot.      Neurological:      Mental Status: He is alert and oriented to person, place, and time.      Sensory: No sensory deficit.      Comments: Light touch, proprioception, and sharp/dull sensation are all intact bilaterally.       Psychiatric:         Behavior: Behavior normal.         Thought Content: Thought content normal.         Judgment: Judgment normal.               Assessment:       Encounter Diagnosis   Name Primary?    Tinea pedis of both feet Yes         Plan:       Shane was seen today for follow-up.    Diagnoses and all orders for this visit:    Tinea pedis of both feet      I counseled the patient on his conditions, their implications and medical management.    Significantly improved and nearly resolved 95%.     Continue to change socks regularly, multiple times daily if excessive sweating occurs.     Advised to always dry well between toes daily. Avoid excessive moisture between toes. Ok to use OTC foot powder to help wick away moisture.     Gentian violet applied to all interdigital spaces today for added drying of  interdigital spaces.     Rx Lotrisone to be applied to affected spaces for another 2 weeks, twice daily.     RTC PRN if symptoms fail to completley resolve or any other complications or pedal concerns arise.

## 2023-01-25 ENCOUNTER — TELEPHONE (OUTPATIENT)
Dept: HEMATOLOGY/ONCOLOGY | Facility: CLINIC | Age: 43
End: 2023-01-25
Payer: MEDICARE

## 2023-02-03 ENCOUNTER — OCCUPATIONAL HEALTH (OUTPATIENT)
Dept: URGENT CARE | Facility: CLINIC | Age: 43
End: 2023-02-03

## 2023-02-03 DIAGNOSIS — Z02.89 ENCOUNTER FOR EXAMINATION REQUIRED BY DEPARTMENT OF TRANSPORTATION (DOT): Primary | ICD-10-CM

## 2023-02-03 PROBLEM — M20.10 ACQUIRED HALLUX VALGUS: Status: ACTIVE | Noted: 2023-02-03

## 2023-02-03 PROCEDURE — 99499 UNLISTED E&M SERVICE: CPT | Mod: S$GLB,,, | Performed by: STUDENT IN AN ORGANIZED HEALTH CARE EDUCATION/TRAINING PROGRAM

## 2023-02-03 PROCEDURE — 99499 PHYSICAL, RECERT DOT/CDL: ICD-10-PCS | Mod: S$GLB,,, | Performed by: STUDENT IN AN ORGANIZED HEALTH CARE EDUCATION/TRAINING PROGRAM

## 2023-02-03 RX ORDER — DULAGLUTIDE 0.75 MG/.5ML
0.75 INJECTION, SOLUTION SUBCUTANEOUS
COMMUNITY
Start: 2022-12-22

## 2023-02-15 ENCOUNTER — TELEPHONE (OUTPATIENT)
Dept: HEMATOLOGY/ONCOLOGY | Facility: CLINIC | Age: 43
End: 2023-02-15
Payer: MEDICARE

## 2023-02-15 NOTE — TELEPHONE ENCOUNTER
Attempted to call and schedule, no answer. Left a brief message with my direct call back number.    ----- Message from Jaye Dietz RN sent at 2/14/2023 12:03 PM CST -----  Hello,    I just spoke with the patient he was incorrectly scheduled for benign heme. Would he be bale to get a spot w genetic counseling?  'Joe,  Jaye   ----- Message -----  From: Amanda Rubio MA  Sent: 1/25/2023   8:34 AM CST  To: Raquel Jaye Vergara, ZULEYMA, #    Thank you, I see the referral in our workqueue, we will reach out to get the patient scheduled.  Thank you!    ----- Message -----  From: Jarrett Barnhart  Sent: 1/25/2023   8:03 AM CST  To: RaquelJaye Herrera RN, #    Katya Cee,    I've included Amanda Rubio in the message. She is assisting while Yohan is out.  ----- Message -----  From: Jaye Dietz RN  Sent: 1/24/2023   4:22 PM CST  To: Raquel VergaraJarrett, Yohan Russell MA, #    Fabi Diaz!    Yohan, would you mind reaching out to the patient?    Thank you all!  Jaye  ----- Message -----  From: Maya Ortiz RN  Sent: 1/24/2023   4:20 PM CST  To: Raquel Jaye Vergara RN, #    Adam Sauceda,  It sounds like the patient should be referred to our high-risk/hereditary cancer genetics team. They can discuss the pt's family history and any genetic testing that may be appropriate.  A message can be sent to Yohan Russell who is the MA for the genetic counselors.   Fabi Diaz    ----- Message -----  From: Jarrett Barnhart  Sent: 1/24/2023   3:08 PM CST  To: Maya Ortiz RN      ----- Message -----  From: Jaye Dietz RN  Sent: 1/24/2023   3:01 PM CST  To: Jarrett JEFF Barnhart    Would go to [facility] for cancer screening testing  ----- Message -----  From: Jarrett Barnhart  Sent: 1/24/2023   3:00 PM CST  To: Jaye Brown RN Hey Silvia,    I'm not sure how to handle this referral.    Patient being referred by a provider they have not  "seen before. Per the referral, "Strong family h/o MM. Mother, grandfather, great grandfather. Referral to see if the literature supports earlier cancer screenings, genetic testing, or enrollment in research study.".    Would this patient start with BMT, or would it be with Genetics?                  "

## 2023-03-07 ENCOUNTER — TELEPHONE (OUTPATIENT)
Dept: HEMATOLOGY/ONCOLOGY | Facility: CLINIC | Age: 43
End: 2023-03-07
Payer: MEDICARE

## 2023-03-07 NOTE — TELEPHONE ENCOUNTER
"Spoke with pt and confirmed genetics appt.    ----- Message from Jaye Dietz RN sent at 1/24/2023  4:21 PM CST -----  Fabi Diaz!    Yohan, would you mind reaching out to the patient?    Thank you all!  Jaye  ----- Message -----  From: Maya Ortiz RN  Sent: 1/24/2023   4:20 PM CST  To: Raquel Vergara, Jaye Dietz, ZULEYMA, #    Hi Jarrett & Jaye,  It sounds like the patient should be referred to our high-risk/hereditary cancer genetics team. They can discuss the pt's family history and any genetic testing that may be appropriate.  A message can be sent to Yohan Russell who is the MA for the genetic counselors.   Fabi Diaz    ----- Message -----  From: Jarrett Barnhart  Sent: 1/24/2023   3:08 PM CST  To: Maya Ortiz RN      ----- Message -----  From: Jaye Dietz RN  Sent: 1/24/2023   3:01 PM CST  To: Jarrett Barnhart    [facility]  ----- Message -----  From: Jarrett Barnhart  Sent: 1/24/2023   3:00 PM CST  To: Jaye Brown, ZULEYMA Cee,    I'm not sure how to handle this referral.    Patient being referred by a provider they have not seen before. Per the referral, "Strong family h/o MM. Mother, grandfather, great grandfather. Referral to see if the literature supports earlier cancer screenings, genetic testing, or enrollment in research study.".    Would this patient start with BMT, or would it be with Genetics?            "

## 2023-03-17 ENCOUNTER — PATIENT MESSAGE (OUTPATIENT)
Dept: HEMATOLOGY/ONCOLOGY | Facility: CLINIC | Age: 43
End: 2023-03-17
Payer: MEDICARE

## 2023-04-19 ENCOUNTER — PATIENT MESSAGE (OUTPATIENT)
Dept: RESEARCH | Facility: HOSPITAL | Age: 43
End: 2023-04-19
Payer: MEDICARE

## 2024-04-29 RX ORDER — CLOTRIMAZOLE AND BETAMETHASONE DIPROPIONATE 10; .64 MG/G; MG/G
CREAM TOPICAL 2 TIMES DAILY
Qty: 45 G | Refills: 5 | Status: SHIPPED | OUTPATIENT
Start: 2024-04-29